# Patient Record
Sex: FEMALE | Race: OTHER | NOT HISPANIC OR LATINO | Employment: UNEMPLOYED | URBAN - METROPOLITAN AREA
[De-identification: names, ages, dates, MRNs, and addresses within clinical notes are randomized per-mention and may not be internally consistent; named-entity substitution may affect disease eponyms.]

---

## 2024-12-09 ENCOUNTER — OFFICE VISIT (OUTPATIENT)
Dept: ORTHOPEDIC SURGERY | Facility: HOSPITAL | Age: 54
End: 2024-12-09
Payer: COMMERCIAL

## 2024-12-09 ENCOUNTER — HOSPITAL ENCOUNTER (OUTPATIENT)
Dept: RADIOLOGY | Facility: HOSPITAL | Age: 54
Discharge: HOME | End: 2024-12-09
Payer: COMMERCIAL

## 2024-12-09 DIAGNOSIS — M25.571 RIGHT ANKLE PAIN, UNSPECIFIED CHRONICITY: ICD-10-CM

## 2024-12-09 DIAGNOSIS — M19.071 ARTHRITIS OF ANKLE, RIGHT: Primary | ICD-10-CM

## 2024-12-09 DIAGNOSIS — M21.171 ACQUIRED VARUS DEFORMITY OF RIGHT ANKLE: ICD-10-CM

## 2024-12-09 PROCEDURE — 99214 OFFICE O/P EST MOD 30 MIN: CPT | Performed by: ORTHOPAEDIC SURGERY

## 2024-12-09 PROCEDURE — 73610 X-RAY EXAM OF ANKLE: CPT | Mod: RIGHT SIDE | Performed by: RADIOLOGY

## 2024-12-09 PROCEDURE — 99204 OFFICE O/P NEW MOD 45 MIN: CPT | Performed by: ORTHOPAEDIC SURGERY

## 2024-12-09 PROCEDURE — 73610 X-RAY EXAM OF ANKLE: CPT | Mod: RT

## 2024-12-09 RX ORDER — LISDEXAMFETAMINE DIMESYLATE 50 MG/1
50 CAPSULE ORAL
COMMUNITY
Start: 2023-03-13

## 2024-12-09 RX ORDER — SERTRALINE HYDROCHLORIDE 100 MG/1
100 TABLET, FILM COATED ORAL
COMMUNITY
Start: 2023-08-31

## 2024-12-09 NOTE — PROGRESS NOTES
HISTORY OF PRESENT ILLNESS  This is a pleasant 54 y.o. year old female  who presents on 12/09/2024 for evaluation of right ankle pain that has been present over/since 2 years worsening.    How the condition occurred or started: insidious  Location of pain (patient points to): anteromedial ankle and plantar lateral foot  Quality of pain: Moderate to severe  Modifying Factors: worse with walking or standing  Associated Signs and symptoms: chronic swelling and lateral foot  Previous Treatment: cortisone injections into ankle, ankle lace-up brace, comes in for second opinion concerning possible total ankle replacement  No fam hx of RA or lupus, ?scleroderma in mom perhaps not confirmed  Nonsmoker    PHYSICAL EXAMINATION  Constitutional Exam: patient's height and weight reviewed, well-kempt  Psychiatric Exam: alert and oriented x 3, appropriate mood and behavior  Eye Exam: PATTIE LITTLEJOHN  Pulmonary Exam: breathing non-labored, no apparent distress  Lymphatic exam: no appreciable lymphadenopathy in the lower extremities  Cardiovascular exam: DP pulses 2+ bilaterally, PT 2+ bilaterally, toes are pink with good capillary refill, no pitting edema  Skin exam: no open lesions, rashes, abrasions or ulcerations  Neurological exam: sensation to light touch intact in both lower extremities in peripheral and dermatomal distributions (except for any abnormalities noted in musculoskeletal exam)    Musculoskeletal exam: right ankle: mild varus alignment, pain over anterior and anteromedial ankle joint line, ROM limited but able to get to neutral DF, stable ligamentous exam, motor strength 5/5 DF/PF/INV/EV, no pain on palpation of the bony (metatarsals) or tendinous structures (peroneals) of the lateral foot and ankle    DATA/RESULTS REVIEW: I personally reviewed the patient's x-ray images and reports of the right ankle showing approximately 10 degrees of varus, grade 4 chondromalacia over medial half of ankle joint, ankle arthritis  present with anterior osteophytes additionally, ST joint spacing present, no acute findings or fractures.     ASSESSMENT: right ankle arthritis, varus  PLAN: I discussed with the patient the differential diagnosis, complex degenerative, hereditary related nature of the condition(s) and available treatment option(s).  Discussed with the patient different options with respect to treating ankle arthritis with mild varus malalignment.  The only other conservative treatment option would be offloading bracing using an [place] brace or AFO type brace to stabilize ankle and hindfoot and decrease pain and give more stability with walking.  The other option surgically would be either ankle fusion versus total ankle replacement.  Discussed with the patient that total ankle replacements are not as reliable as knee or hip replacements, mainly due to the limited bone available to see the implants and possibility of subsidence of the implants particularly in the talus.  Total ankle replacement does not restore full range of motion but does have improved motion as compared to fusion.  Fusion typically requires less revision surgery but sacrifices motion at the ankle and arthritis can develop in the subtalar joint over time.  However, approximally 20 degrees of dorsiflexion and plantarflexion can be achieved through the midfoot and so gait is relatively normal with ankle fusion except for decreased stride length typically.  In more active patients or labors, we would prefer ankle fusion due to better longevity and function.  Discussed with her the alternatives in detail and answered her questions and she does have significant functional limitations due to her ankle arthritis.  The patient was given a prescription for custom-made [place] brace, which is medically necessary due to the patient's medical condition and symptomatic ankle arthritis with varus and required for medial-lateral stability.  The patient is ambulatory.  Duration  will be greater than 6 months.  Patient had further questions with respect to the total ankle replacement and so we referred her to her excellent specialist in [facility], Dr. Gray Latif to get his opinion and answer her questions.  The patient's questions were answered in detail.      Note dictated with BPT software, completed without full type editing to avoid delay.

## 2025-01-09 ENCOUNTER — APPOINTMENT (OUTPATIENT)
Dept: ORTHOPEDIC SURGERY | Facility: CLINIC | Age: 55
End: 2025-01-09
Payer: COMMERCIAL